# Patient Record
Sex: MALE | Race: WHITE | NOT HISPANIC OR LATINO | Employment: OTHER | URBAN - METROPOLITAN AREA
[De-identification: names, ages, dates, MRNs, and addresses within clinical notes are randomized per-mention and may not be internally consistent; named-entity substitution may affect disease eponyms.]

---

## 2017-05-06 PROCEDURE — 86803 HEPATITIS C AB TEST: CPT | Performed by: INTERNAL MEDICINE

## 2017-06-06 PROBLEM — Z80.0 FH: COLON CANCER: Status: ACTIVE | Noted: 2017-06-06

## 2017-11-16 PROBLEM — J30.9 CHRONIC ALLERGIC RHINITIS, UNSPECIFIED SEASONALITY, UNSPECIFIED TRIGGER: Status: ACTIVE | Noted: 2017-11-16

## 2018-12-11 PROBLEM — J30.9 CHRONIC ALLERGIC RHINITIS: Status: ACTIVE | Noted: 2017-11-16

## 2018-12-11 PROBLEM — M20.41 HAMMERTOE OF RIGHT FOOT: Status: ACTIVE | Noted: 2018-12-11

## 2019-06-17 PROBLEM — M54.50 RIGHT-SIDED LOW BACK PAIN WITHOUT SCIATICA, UNSPECIFIED CHRONICITY: Status: ACTIVE | Noted: 2019-06-17

## 2021-08-27 PROBLEM — M17.0 PRIMARY OSTEOARTHRITIS OF BOTH KNEES: Status: ACTIVE | Noted: 2021-08-27

## 2024-03-09 ENCOUNTER — HOSPITAL ENCOUNTER (OUTPATIENT)
Dept: LAB | Age: 74
Discharge: HOME | End: 2024-03-09

## 2024-03-09 DIAGNOSIS — Z11.2 ENCOUNTER FOR SCREENING FOR OTHER BACTERIAL DISEASES: Primary | ICD-10-CM

## 2024-03-09 PROCEDURE — 87081 CULTURE SCREEN ONLY: CPT | Performed by: CLINICAL MEDICAL LABORATORY

## 2024-03-10 LAB — MRSA SPEC QL CULT: NORMAL

## 2024-10-23 NOTE — H&P (VIEW-ONLY)
Julien Aldridge is a 74 year old male.   Patient presents with:  New Patient: Referred by Dr. Brush, Left inguinal hernia  ________________________________________________________________________  HPI:  This 74-year-old male presents as a patient of Dr. Brush in referral from Dr. Huynh for evaluation of a left inguinal hernia.      He noticed a bulge in the left groin area for recently.  It came to his attention on a cruise, where he was crouching in a shallow pool.  It was irritated, and he felt a bulge.  It gurgles, he massages it, and it goes away.  It is flat and asymptomatic in the morning, bulges out after being on his feet.  Few minor pinches of pain.  No incarceration.    PSH: No abdominal operations.  He had right total knee replacement 2 years ago, and a left total knee replacement 7 months ago.  The second 1 was done robotically, and the pain and recovery was much better.  He overdid it on the physical therapy and developed a left hamstring issue.  Occ: Retired, did various manual labor jobs.  He was referred by his friend John Bob on whom I operated.  FH: His brother developed colorectal cancer and .  The patient was concerned this could be related to such a problem.    Unaccompanied    Past Surgical History:   Procedure Laterality Date   • COLON CA SCRN NOT HI RSK IND N/A 2015    Procedure: COLONOSCOPY by WILNER Coronel MD Location: [facility],   • COLONOSCOPY     • COLONOSCOPY N/A 2020    Procedure: COLONOSCOPY, POSSIBLE BIOPSY, POSSIBLE POLYPECTOMY 67755;  Surgeon: Paddy Coronel MD;  Location: [facility], [facility]   • OTHER SURGICAL HISTORY      oral   • OTHER SURGICAL HISTORY  1973    tonsils   • OTHER SURGICAL HISTORY      right foot bunion   • OTHER SURGICAL HISTORY      right knee arthroscopy   • OTHER SURGICAL HISTORY      left arm lipoma   • PATIENT DOCUMENTED NOT TO HAVE EXPERIENCED ANY OF THE FOLLOWING EVENTS N/A 2015    Procedure: COLONOSCOPY,  POSSIBLE BIOPSY, POSSIBLE POLYPECTOMY 32826;  Surgeon: Paddy Coronel MD;  Location: [facility]   • PATIENT WITHOUGH PREOPERATIVE ORDER FOR IV ANTIBIOTIC SURGICAL SITE INFECTION PROPHYLAXIS. N/A 7/11/2015    Procedure: COLONOSCOPY, POSSIBLE BIOPSY, POSSIBLE POLYPECTOMY 76872;  Surgeon: Paddy Coronel MD;  Location: [facility]       Past Medical History:   Diagnosis Date   • Anxiety    • Essential hypertension        ALLERGIES:  Pcn [Penicillins]       UNKNOWN    Comment:40 years ago  CURRENT MEDS:  Current Outpatient Medications   Medication Sig Dispense Refill   • HYDROcodone-acetaminophen  MG Oral Tab  (Patient not taking: Reported on 10/16/2024)     • ibuprofen 400 MG Oral Tab Take 400 mg by mouth every 6 (six) hours as needed for Pain.     • losartan 100 MG Oral Tab Take 1 tablet (100 mg total) by mouth daily. 90 tablet 3   • Pseudoephedrine HCl, Deter, 30 MG Oral Tablet Abuse-Deterrent Take 30 mg by mouth daily as needed.       • Loratadine 10 MG Oral Cap Take 10 mg by mouth daily as needed.         ________________________________________________________________________  ROS:  GENERAL HEALTH: otherwise feels well  HEENT: no sinus pain or sore throat; no hearing loss  RESPIRATORY: denies shortness of breath, wheezing or cough   CARDIOVASCULAR: denies chest pain or HANSEN; no palpitations   GI: denies nausea, vomiting, constipation, diarrhea; no rectal bleeding; no heartburn  GENITAL/: no dysuria, urgency or frequency; bulging in the left groin area  HEMATOLOGY: denies hx anemia; denies bruising or excessive bleeding  ________________________________________________________________________  PHYSICAL EXAM:  GENERAL: well developed, well nourished male, in no apparent distress  NECK: supple; no JVD  RESPIRATORY: lungs clear  CARDIOVASCULAR: RRR  ABDOMEN: normal active BS+, soft, nondistended, no masses, nontender  GENITAL/: reducible left inguinal hernia (he states it  gets significantly bigger), no right inguinal hernia, testes ok  EXTREMITIES: no edema      DATE OF SERVICE: 10.16.2024   GROIN LEFT LIMITED SH(CPT=76882)    CLINICAL INDICATION:  Left inguinal hernia.    COMPARISON:  None available.    TECHNIQUE: Grayscale imaging was performed over the patient's area of subjective concern with a  linear transducer and multiple static images were obtained. Cine images were utilized as needed.    Impression   FINDINGS/IMPRESSION:  2 small fat-containing hernias in the left lower abdominal wall superior to the inguinal ligament.  CT recommended.     Noted.  We discussed the ultrasound result.  I do not feel he would need to have a CT scan, having examined him.    ________________________________________________________________________  ASSESSMENT/ PLAN:  Left inguinal hernia    Recommendation:  Robotic left inguinal hernia repair with mesh    The risks, benefits and alternatives were discussed including bleeding, infection, numbness over the incision site, recurrence of the hernia, mesh infection, testicular injuries, chronic postoperative pain, urinary retention, robotic/laparoscopic/open technique, anesthesia, return to activity, etc.  We also discussed the wound swelling and hardness that occurs postoperatively during the healing process.  Postoperative bruising and swelling of the genitalia is common, and more pronounced with larger hernias.  The patient received the booklet and saw the mesh sample.  Postoperatively no strenuous exercises or lifting, no more than 5 to 10 lbs.   All questions were answered.  The patient may call the  and schedule the surgery under general anesthesia at the [facility].  Patient will need SCIP antibiotics and SCDs.  Hold NSAIDs.  He states he has Norco leftover from his knee replacement surgery, and will take that postoperatively, half a tablet at a time.  He will see Dr. Brush or Dr. Huynh for  preoperative history and physical examination.    Thank you for involving me in the care of your patient.  ________________________________________________________________________

## 2024-10-29 RX ORDER — MULTIVITAMIN
1 TABLET ORAL DAILY
COMMUNITY

## 2024-10-29 RX ORDER — LOSARTAN POTASSIUM 100 MG/1
100 TABLET ORAL DAILY
COMMUNITY

## 2024-11-21 ENCOUNTER — HOSPITAL ENCOUNTER (OUTPATIENT)
Facility: HOSPITAL | Age: 74
Setting detail: HOSPITAL OUTPATIENT SURGERY
Discharge: HOME | End: 2024-11-21
Attending: SURGERY | Admitting: SURGERY
Payer: MEDICARE

## 2024-11-21 ENCOUNTER — ANESTHESIA (OUTPATIENT)
Dept: SURGERY | Facility: HOSPITAL | Age: 74
End: 2024-11-21
Payer: MEDICARE

## 2024-11-21 ENCOUNTER — ANESTHESIA EVENT (OUTPATIENT)
Dept: SURGERY | Facility: HOSPITAL | Age: 74
End: 2024-11-21
Payer: MEDICARE

## 2024-11-21 VITALS
TEMPERATURE: 98 F | HEIGHT: 70 IN | WEIGHT: 229 LBS | BODY MASS INDEX: 32.78 KG/M2 | HEART RATE: 52 BPM | RESPIRATION RATE: 16 BRPM | DIASTOLIC BLOOD PRESSURE: 88 MMHG | SYSTOLIC BLOOD PRESSURE: 132 MMHG | OXYGEN SATURATION: 95 %

## 2024-11-21 DIAGNOSIS — K40.90 LEFT INGUINAL HERNIA: Primary | ICD-10-CM

## 2024-11-21 PROCEDURE — 8E0W4CZ ROBOTIC ASSISTED PROCEDURE OF TRUNK REGION, PERCUTANEOUS ENDOSCOPIC APPROACH: ICD-10-PCS | Performed by: SURGERY

## 2024-11-21 PROCEDURE — 0YU64JZ SUPPLEMENT LEFT INGUINAL REGION WITH SYNTHETIC SUBSTITUTE, PERCUTANEOUS ENDOSCOPIC APPROACH: ICD-10-PCS | Performed by: SURGERY

## 2024-11-21 DEVICE — LAPAROSCOPIC SELF-FIXATING MESH POLYESTER WITH POLYLACTIC ACID GRIPS AND COLLAGEN FILM
Type: IMPLANTABLE DEVICE | Site: INGUINAL | Status: FUNCTIONAL
Brand: PROGRIP

## 2024-11-21 RX ORDER — SODIUM CHLORIDE, SODIUM LACTATE, POTASSIUM CHLORIDE, CALCIUM CHLORIDE 600; 310; 30; 20 MG/100ML; MG/100ML; MG/100ML; MG/100ML
INJECTION, SOLUTION INTRAVENOUS CONTINUOUS
Status: DISCONTINUED | OUTPATIENT
Start: 2024-11-21 | End: 2024-11-21

## 2024-11-21 RX ORDER — HYDROMORPHONE HYDROCHLORIDE 1 MG/ML
0.2 INJECTION, SOLUTION INTRAMUSCULAR; INTRAVENOUS; SUBCUTANEOUS EVERY 5 MIN PRN
Status: DISCONTINUED | OUTPATIENT
Start: 2024-11-21 | End: 2024-11-21

## 2024-11-21 RX ORDER — MEPERIDINE HYDROCHLORIDE 25 MG/ML
12.5 INJECTION INTRAMUSCULAR; INTRAVENOUS; SUBCUTANEOUS AS NEEDED
Status: DISCONTINUED | OUTPATIENT
Start: 2024-11-21 | End: 2024-11-21

## 2024-11-21 RX ORDER — METOCLOPRAMIDE HYDROCHLORIDE 5 MG/ML
10 INJECTION INTRAMUSCULAR; INTRAVENOUS EVERY 8 HOURS PRN
Status: DISCONTINUED | OUTPATIENT
Start: 2024-11-21 | End: 2024-11-21

## 2024-11-21 RX ORDER — ACETAMINOPHEN 500 MG
1000 TABLET ORAL ONCE AS NEEDED
Status: COMPLETED | OUTPATIENT
Start: 2024-11-21 | End: 2024-11-21

## 2024-11-21 RX ORDER — HYDROMORPHONE HYDROCHLORIDE 1 MG/ML
0.4 INJECTION, SOLUTION INTRAMUSCULAR; INTRAVENOUS; SUBCUTANEOUS EVERY 5 MIN PRN
Status: DISCONTINUED | OUTPATIENT
Start: 2024-11-21 | End: 2024-11-21

## 2024-11-21 RX ORDER — LABETALOL HYDROCHLORIDE 5 MG/ML
5 INJECTION, SOLUTION INTRAVENOUS EVERY 5 MIN PRN
Status: DISCONTINUED | OUTPATIENT
Start: 2024-11-21 | End: 2024-11-21

## 2024-11-21 RX ORDER — HYDROCODONE BITARTRATE AND ACETAMINOPHEN 5; 325 MG/1; MG/1
1 TABLET ORAL ONCE AS NEEDED
Status: COMPLETED | OUTPATIENT
Start: 2024-11-21 | End: 2024-11-21

## 2024-11-21 RX ORDER — HYDROMORPHONE HYDROCHLORIDE 1 MG/ML
INJECTION, SOLUTION INTRAMUSCULAR; INTRAVENOUS; SUBCUTANEOUS
Status: COMPLETED
Start: 2024-11-21 | End: 2024-11-21

## 2024-11-21 RX ORDER — BUPIVACAINE HYDROCHLORIDE 2.5 MG/ML
INJECTION, SOLUTION EPIDURAL; INFILTRATION; INTRACAUDAL AS NEEDED
Status: DISCONTINUED | OUTPATIENT
Start: 2024-11-21 | End: 2024-11-21 | Stop reason: HOSPADM

## 2024-11-21 RX ORDER — LIDOCAINE HYDROCHLORIDE 10 MG/ML
INJECTION, SOLUTION EPIDURAL; INFILTRATION; INTRACAUDAL; PERINEURAL AS NEEDED
Status: DISCONTINUED | OUTPATIENT
Start: 2024-11-21 | End: 2024-11-21 | Stop reason: SURG

## 2024-11-21 RX ORDER — ROCURONIUM BROMIDE 10 MG/ML
INJECTION, SOLUTION INTRAVENOUS AS NEEDED
Status: DISCONTINUED | OUTPATIENT
Start: 2024-11-21 | End: 2024-11-21 | Stop reason: SURG

## 2024-11-21 RX ORDER — HEPARIN SODIUM 5000 [USP'U]/ML
5000 INJECTION, SOLUTION INTRAVENOUS; SUBCUTANEOUS ONCE
Status: COMPLETED | OUTPATIENT
Start: 2024-11-21 | End: 2024-11-21

## 2024-11-21 RX ORDER — ACETAMINOPHEN 500 MG
1000 TABLET ORAL ONCE
Status: DISCONTINUED | OUTPATIENT
Start: 2024-11-21 | End: 2024-11-21 | Stop reason: HOSPADM

## 2024-11-21 RX ORDER — HYDROCODONE BITARTRATE AND ACETAMINOPHEN 5; 325 MG/1; MG/1
2 TABLET ORAL ONCE AS NEEDED
Status: COMPLETED | OUTPATIENT
Start: 2024-11-21 | End: 2024-11-21

## 2024-11-21 RX ORDER — DEXAMETHASONE SODIUM PHOSPHATE 4 MG/ML
VIAL (ML) INJECTION AS NEEDED
Status: DISCONTINUED | OUTPATIENT
Start: 2024-11-21 | End: 2024-11-21 | Stop reason: SURG

## 2024-11-21 RX ORDER — ONDANSETRON 2 MG/ML
4 INJECTION INTRAMUSCULAR; INTRAVENOUS EVERY 6 HOURS PRN
Status: DISCONTINUED | OUTPATIENT
Start: 2024-11-21 | End: 2024-11-21

## 2024-11-21 RX ORDER — NEOSTIGMINE METHYLSULFATE 1 MG/ML
INJECTION INTRAVENOUS AS NEEDED
Status: DISCONTINUED | OUTPATIENT
Start: 2024-11-21 | End: 2024-11-21 | Stop reason: SURG

## 2024-11-21 RX ORDER — ONDANSETRON 2 MG/ML
INJECTION INTRAMUSCULAR; INTRAVENOUS AS NEEDED
Status: DISCONTINUED | OUTPATIENT
Start: 2024-11-21 | End: 2024-11-21 | Stop reason: SURG

## 2024-11-21 RX ORDER — HYDROMORPHONE HYDROCHLORIDE 1 MG/ML
0.6 INJECTION, SOLUTION INTRAMUSCULAR; INTRAVENOUS; SUBCUTANEOUS EVERY 5 MIN PRN
Status: DISCONTINUED | OUTPATIENT
Start: 2024-11-21 | End: 2024-11-21

## 2024-11-21 RX ORDER — NALOXONE HYDROCHLORIDE 0.4 MG/ML
80 INJECTION, SOLUTION INTRAMUSCULAR; INTRAVENOUS; SUBCUTANEOUS AS NEEDED
Status: DISCONTINUED | OUTPATIENT
Start: 2024-11-21 | End: 2024-11-21

## 2024-11-21 RX ORDER — DIPHENHYDRAMINE HYDROCHLORIDE 50 MG/ML
12.5 INJECTION INTRAMUSCULAR; INTRAVENOUS AS NEEDED
Status: DISCONTINUED | OUTPATIENT
Start: 2024-11-21 | End: 2024-11-21

## 2024-11-21 RX ORDER — PHENYLEPHRINE HCL 10 MG/ML
VIAL (ML) INJECTION AS NEEDED
Status: DISCONTINUED | OUTPATIENT
Start: 2024-11-21 | End: 2024-11-21 | Stop reason: SURG

## 2024-11-21 RX ORDER — GLYCOPYRROLATE 0.2 MG/ML
INJECTION, SOLUTION INTRAMUSCULAR; INTRAVENOUS AS NEEDED
Status: DISCONTINUED | OUTPATIENT
Start: 2024-11-21 | End: 2024-11-21 | Stop reason: SURG

## 2024-11-21 RX ADMIN — GLYCOPYRROLATE 0.4 MG: 0.2 INJECTION, SOLUTION INTRAMUSCULAR; INTRAVENOUS at 11:47:00

## 2024-11-21 RX ADMIN — ROCURONIUM BROMIDE 50 MG: 10 INJECTION, SOLUTION INTRAVENOUS at 09:56:00

## 2024-11-21 RX ADMIN — PHENYLEPHRINE HCL 50 MCG: 10 MG/ML VIAL (ML) INJECTION at 10:17:00

## 2024-11-21 RX ADMIN — SODIUM CHLORIDE, SODIUM LACTATE, POTASSIUM CHLORIDE, CALCIUM CHLORIDE: 600; 310; 30; 20 INJECTION, SOLUTION INTRAVENOUS at 09:47:00

## 2024-11-21 RX ADMIN — DEXAMETHASONE SODIUM PHOSPHATE 8 MG: 4 MG/ML VIAL (ML) INJECTION at 11:37:00

## 2024-11-21 RX ADMIN — PHENYLEPHRINE HCL 50 MCG: 10 MG/ML VIAL (ML) INJECTION at 10:58:00

## 2024-11-21 RX ADMIN — ONDANSETRON 4 MG: 2 INJECTION INTRAMUSCULAR; INTRAVENOUS at 11:37:00

## 2024-11-21 RX ADMIN — LIDOCAINE HYDROCHLORIDE 100 MG: 10 INJECTION, SOLUTION EPIDURAL; INFILTRATION; INTRACAUDAL; PERINEURAL at 09:56:00

## 2024-11-21 RX ADMIN — NEOSTIGMINE METHYLSULFATE 3 MG: 1 INJECTION INTRAVENOUS at 11:47:00

## 2024-11-21 RX ADMIN — GLYCOPYRROLATE 0.3 MG: 0.2 INJECTION, SOLUTION INTRAMUSCULAR; INTRAVENOUS at 10:17:00

## 2024-11-21 RX ADMIN — PHENYLEPHRINE HCL 50 MCG: 10 MG/ML VIAL (ML) INJECTION at 11:10:00

## 2024-11-21 RX ADMIN — PHENYLEPHRINE HCL 50 MCG: 10 MG/ML VIAL (ML) INJECTION at 10:55:00

## 2024-11-21 NOTE — DISCHARGE INSTRUCTIONS
Okay to shower on 11/22  Leave skin glue until it flakes off  Ice to the incisions  Over-the-counter pain medications are okay  He has prescriptions for tramadol, Norco, and oxycodone at home for breakthrough pain  Expect bruising and swelling  No lifting over 10 pounds for 2 weeks  No exercise more vigorous than walking for 2 weeks  Stool softener    You have been given a prescription for Norco 5/325    - Norco was given to you at: 1:30PM    - Next dose is due: after 5:30PM    - Take this medication as directed    Norco is a narcotic and can be constipating or can upset your stomach    - Don't take Norco on an empty stomach    - Drink plenty of water    - This medication contains Tylenol (acetaminophen)    - Do not take additional Tylenol while taking Norco

## 2024-11-21 NOTE — OPERATIVE REPORT
[facility] OPERATIVE REPORT     Patient Name:  Julien Aldridge  MRN: [id number]    Date of Operation:  2024  : 9/15/1950        PREOPERATIVE DIAGNOSIS: Left inguinal hernia.     POSTOPERATIVE DIAGNOSIS: Left inguinal hernia.      PROCEDURE PERFORMED:  Robotic left inguinal hernia repair with mesh.     SURGEON: Mateusz Mark MD     ASSISTANT: CITLALY Caceres, (services required for positioning, prepping, draping, setting up the field, exposing, retracting, suturing, moving tissue, running the camera, introducing suture and the mesh, closing, tying, cutting, dressing, etc.)    ANESTHESIA: General.      COMPLICATIONS: None.      ESTIMATED BLOOD LOSS: 5 mL.      DRAINS: None.      BLOOD PRODUCTS ADMINISTERED: None.      IMPLANTS AND GRAFTS:  12 x 16 cm ProGrip Mesh     SPECIMENS: None.      HISTORY: This patient is a 74 year old year old-year-old male who has a history of a symptomatic left groin bulge.  He was evaluated and found to have a reducible left inguinal hernia on examination, as confirmed on ultrasound. Robotic left inguinal hernia repair with mesh was recommended. The risks, benefits, and alternatives were discussed.      FINDINGS: The patient had a large indirect left inguinal hernia repaired with a 12 x 16 cm ProGrip mesh patch.     PROCEDURE:  The patient was seen in the preoperative holding area with his friend, Sharon.  The surgical plan was reviewed.  The left groin was initialed.  He was taken to the operating room and placed in the supine position.  He was administered general anesthesia.  The abdomen was clipped, prepped, and draped.  A timeout was performed.  Local anesthetic was infiltrated.  A Veress needle was introduced above the umbilicus.  The abdomen was insufflated with carbon dioxide gas.  An 8 mm trocar was inserted, followed by the scope.  There was no entry injury.  Additional local anesthetic was infiltrated, and an additional 8 mm trocar was placed on each side of  the abdomen under laparoscopic vision.  The abdomen and pelvis were inspected.  The visualized peritoneal surfaces, omentum, and intestines were unremarkable.  The patient was placed in Trendelenburg position.  The pelvis was visualized.  The patient had a large indirect left inguinal hernia without incarceration.  There was no right inguinal hernia.  The robot was docked and targeted.  The instruments were introduced under vision, and the ports were burped.  Working from the console, the peritoneum was scored from the anterior superior iliac spine area to the midline.  The peritoneal flap was developed into the space of Retzius, extending the dissection along Alessio's ligament to the right of the midline, and around to the area of the iliac vessels.  Laterally, the dissection was carried as close to the peritoneum as possible to leave the adventitial and adipose tissues with the retroperitoneum.  Throughout all of the dissection, care was used to identify and preserve the epigastric vessels, the iliac vessels, the spermatic cord structures, and the vas deferens.  The flap dissection was carried to the hernia.  The large indirect hernia sac was tediously dissected from the spermatic cord structures and reduced.  Several incidental defects in the peritoneum occurred consequential to the tedious and extensive dissection.  There was no cord lipoma.  The dissection was continued posteriorly to beyond the genu of the vas deferens.  The space was confirmed to be hemostatic.  A 12 x 16 cm ProGrip mesh was introduced.  It was positioned with the adherent side facing the abdominal wall, and centered on the hernia defect.  It wrapped around Alessio's ligament into the space of Retzius.  It crossed the midline to the right.  Care was used to confirm there was no peritoneum extending beneath the edges of the mesh, and that there were no wrinkles.  The mesh provided complete coverage of the myopectineal orifice on the left.   Tacking sutures of Vicryl were used to secure the mesh to Alessio's ligament medially, and to the underside of the anterior abdominal wall anteriorly.  Care was used to avoid placing tacking sutures in the triangles of \"pain\" and \"doom\".  Satisfied with the mesh positioning and fixation, the peritoneum was closed with a running V-Loc suture.  This provided a tight closure of the peritoneal flap suture line without defects or exposed mesh.  Completion inspection of the peritoneal cavity confirmed the absence of inadvertent injury.  The more posterior defects in the peritoneum consequential to the dissection were closed with V-Loc, completely closing the peritoneum and leaving no defects for herniation.  The mesh was visualized through the peritoneum in excellent position without shifting or wrinkling.  The fascial closure device was used to secure the fascia at the midline trocar site with a 0 Vicryl suture.  The remaining trochars were removed under direct vision internally to assure hemostasis.  The abdomen was desufflated.  The wounds were irrigated, rendered hemostatic, closed with Vicryl subcuticular stitches, and dressed with Dermabond.  The needle, sponge, and instrument counts were reported as correct.  The position of the testicles was confirmed in the scrotum.  The patient was awakened and transported to recovery.  Sharon was notified of the findings and his status.        Mateusz Mark MD

## 2024-11-21 NOTE — ANESTHESIA PROCEDURE NOTES
Airway  Date/Time: 11/21/2024 9:56 AM  Urgency: elective    Airway not difficult    General Information and Staff    Patient location during procedure: OR  Anesthesiologist: Jordy Nicole MD  Performed: anesthesiologist   Performed by: Jordy Nicole MD  Authorized by: Jordy Nicole MD      Indications and Patient Condition  Indications for airway management: anesthesia  Spontaneous Ventilation: absent  Sedation level: deep  Preoxygenated: yes  Patient position: sniffing  Mask difficulty assessment: 1 - vent by mask    Final Airway Details  Final airway type: endotracheal airway      Successful airway: ETT  Cuffed: yes   Successful intubation technique: direct laryngoscopy  Endotracheal tube insertion site: oral  Blade: Alyssa  Blade size: #3  ETT size (mm): 7.5    Cormack-Lehane Classification: grade IIA - partial view of glottis  Placement verified by: capnometry   Cuff volume (mL): 9  Measured from: lips  ETT to lips (cm): 24  Number of attempts at approach: 1  Number of other approaches attempted: 0

## 2024-11-21 NOTE — INTERVAL H&P NOTE
Pre-op Diagnosis: LEFT INGUINAL HERNIA    The above referenced H&P was reviewed by Mateusz Balwdin MD on 11/21/2024, the patient was examined and no significant changes have occurred in the patient's condition since the H&P was performed.  I discussed with the patient and/or legal representative the potential benefits, risks and side effects of this procedure; the likelihood of the patient achieving goals; and potential problems that might occur during recuperation.  I discussed reasonable alternatives to the procedure, including risks, benefits and side effects related to the alternatives and risks related to not receiving this procedure.  We will proceed with procedure as planned.    No changes today. I met with him and his friend, legs.  The surgical plan was reviewed.  We discussed anesthesia, recovery, restrictions, pain management, etc.  He has tramadol, Norco, and OxyContin at home as leftovers from prior surgeries, which he plans to take if needed.  The left groin was initialed.

## 2024-11-21 NOTE — ANESTHESIA POSTPROCEDURE EVALUATION
[facility]    Julien [facility]INOS[facility] Patient Status:  Hospital Outpatient Surgery   Age/Gender 74 year old male MRN [id number]   Location [facility] POST ANESTHESIA CARE UNIT Attending Mateusz Baldwin MD   Hosp Day # 0 PCP Melvin Brush MD       Anesthesia Post-op Note    XI ROBOT-ASSISTED LEFT INGUINAL HERNIA REPAIR WITH MESH    Procedure Summary       Date: 11/21/24 Room / Location:  MAIN OR  /  MAIN OR    Anesthesia Start: 0947 Anesthesia Stop: 1153    Procedure: XI ROBOT-ASSISTED LEFT INGUINAL HERNIA REPAIR WITH MESH (Left: Abdomen) Diagnosis: (LEFT INGUINAL HERNIA)    Surgeons: Mateusz Baldwin MD Anesthesiologist: Jordy Nicole MD    Anesthesia Type: general ASA Status: 2            Anesthesia Type: general    Vitals Value Taken Time   /69 11/21/24 1203   Temp 98.2 °F (36.8 °C) 11/21/24 1153   Pulse 47 11/21/24 1203   Resp 15 11/21/24 1203   SpO2 97 % 11/21/24 1203   Vitals shown include unfiled device data.    Patient Location: PACU    Anesthesia Type: general    Airway Patency: patent and extubated    Postop Pain Control: adequate    Mental Status: preanesthetic baseline    Nausea/Vomiting: none    Cardiopulmonary/Hydration status: stable euvolemic    Complications: no apparent anesthesia related complications    Postop vital signs: stable    Dental Exam: Unchanged from Preop    Patient to be discharged from PACU when criteria met.

## 2024-11-21 NOTE — ANESTHESIA PREPROCEDURE EVALUATION
PRE-OP EVALUATION    Patient Name: Julien Aldridge    Admit Diagnosis: LEFT INGUINAL HERNIA    Pre-op Diagnosis: LEFT INGUINAL HERNIA    XI ROBOT-ASSISTED LEFT INGUINAL HERNIA REPAIR WITH MESH, POSSIBLE OPEN    Anesthesia Procedure: XI ROBOT-ASSISTED LEFT INGUINAL HERNIA REPAIR WITH MESH, POSSIBLE OPEN (Left: Abdomen)    Surgeons and Role:     * Mateusz Baldwin MD - Primary    Pre-op vitals reviewed.  Temp: 97.7 °F (36.5 °C)  Pulse: 65  Resp: 16  BP: 142/84  SpO2: 98 %  Body mass index is 32.86 kg/m².    Current medications reviewed.  Hospital Medications:   lactated ringers infusion   Intravenous Continuous    [COMPLETED] heparin (Porcine) 5000 UNIT/ML injection 5,000 Units  5,000 Units Subcutaneous Once    bupivacaine PF (Marcaine) 0.25% injection    PRN       Outpatient Medications:   Prescriptions Prior to Admission[1]    Allergies: Pcn [penicillins]      Anesthesia Evaluation    Patient summary reviewed.    Anesthetic Complications  (-) history of anesthetic complications         GI/Hepatic/Renal    Negative GI/hepatic/renal ROS.  (-) GERD                           Cardiovascular      ECG reviewed.  Exercise tolerance: good     MET: >4    (+) obesity  (+) hypertension       (-) past MI                (-) angina     (-) HANSEN  (-) orthopnea  (-) PND     Endo/Other    Negative endo/other ROS.                              Pulmonary      (+) asthma                     Neuro/Psych    Negative neuro/psych ROS.                                  Past Surgical History:   Procedure Laterality Date    Colon ca scrn not hi rsk ind N/A 07/11/2015    Procedure: COLONOSCOPY by WILNER Coronel MD Location: [facility],    Colonoscopy N/A 08/14/2020    Procedure: COLONOSCOPY, POSSIBLE BIOPSY, POSSIBLE POLYPECTOMY 94218;  Surgeon: Paddy Coronel MD;  Location: [facility], [facility]    Other surgical history      oral    Other surgical history      right foot bunion    Other surgical history      right knee arthroscopy     Other surgical history      left arm lipoma    Patient documented not to have experienced any of the following events N/A 07/11/2015    Procedure: COLONOSCOPY, POSSIBLE BIOPSY, POSSIBLE POLYPECTOMY 51306;  Surgeon: Paddy Coronel MD;  Location: [facility]    Patient withough preoperative order for iv antibiotic surgical site infection prophylaxis. N/A 07/11/2015    Procedure: COLONOSCOPY, POSSIBLE BIOPSY, POSSIBLE POLYPECTOMY 54988;  Surgeon: Paddy Coronel MD;  Location: [facility]    Tonsillectomy  1973    Total knee replacement      Upper gi endoscopy,exam       Social History     Socioeconomic History    Marital status: Single   Occupational History    Occupation: retired from machine shop--   Tobacco Use    Smoking status: Never    Smokeless tobacco: Never   Vaping Use    Vaping status: Never Used   Substance and Sexual Activity    Alcohol use: Yes     Alcohol/week: 1.0 - 2.0 standard drink of alcohol     Types: 1 - 2 Standard drinks or equivalent per week    Drug use: No    Sexual activity: Yes     Partners: Female     History   Drug Use No     Available pre-op labs reviewed.               Airway      Mallampati: I  Mouth opening: >3 FB  TM distance: > 6 cm  Neck ROM: full Cardiovascular    Cardiovascular exam normal.  Rhythm: regular  Rate: normal  (-) murmur   Dental    Dentition appears grossly intact         Pulmonary    Pulmonary exam normal.  Breath sounds clear to auscultation bilaterally.        (-) wheezes       Other findings              ASA: 2   Plan: general  NPO status verified and patient meets guidelines.  Patient has not taken beta blockers in last 24 hours.        Plan/risks discussed with: patient              We discussed GA w/ETT and possible scratchy throat and rarely dental damage.  We discussed analgesic plan and PONV prophylaxis.  The patient's questions were answered and consent was attained.            [1]   Medications Prior to Admission    Medication Sig Dispense Refill Last Dose/Taking    losartan 100 MG Oral Tab Take 1 tablet (100 mg total) by mouth daily.   11/20/2024 at  8:00 AM    Multiple Vitamin (ONE-DAILY MULTI VITAMINS) Oral Tab Take 1 tablet by mouth daily.   Past Month    Pseudoephedrine HCl, Deter, 30 MG Oral Tablet Abuse-Deterrent Take 30 mg by mouth daily.   Past Month    Loratadine 10 MG Oral Cap Take 10 mg by mouth daily.    Past Month

## (undated) DEVICE — TIP COVER ACCESSORY

## (undated) DEVICE — LAPAROVUE VISIBILITY SYSTEM LAPAROSCOPIC SOLUTIONS: Brand: LAPAROVUE

## (undated) DEVICE — SEAL

## (undated) DEVICE — ABSORBABLE WOUND CLOSURE DEVICE: Brand: V-LOC 90

## (undated) DEVICE — ARM DRAPE

## (undated) DEVICE — MONOPOLAR CURVED SCISSORS: Brand: ENDOWRIST

## (undated) DEVICE — ADHESIVE SKIN TOP FOR WND CLSR DERMBND ADV

## (undated) DEVICE — ROBOTIC GENERAL: Brand: MEDLINE INDUSTRIES, INC.

## (undated) DEVICE — FENESTRATED BIPOLAR FORCEPS: Brand: ENDOWRIST

## (undated) DEVICE — DRAPE,TOP,102X53,STERILE: Brand: MEDLINE

## (undated) DEVICE — BLADELESS OBTURATOR: Brand: WECK VISTA

## (undated) DEVICE — HUNTER GASPER TIP, DISPOSABLE: Brand: RENEW

## (undated) DEVICE — ANTIBACTERIAL UNDYED BRAIDED (POLYGLACTIN 910), SYNTHETIC ABSORBABLE SUTURE: Brand: COATED VICRYL

## (undated) DEVICE — COLUMN DRAPE

## (undated) DEVICE — GLOVE SUR 7.5 SENSICARE PI PIP CRM PWD F

## (undated) DEVICE — COVER,LIGHT,CAMERA,HARD,1/PK,STRL: Brand: MEDLINE

## (undated) DEVICE — 40580 - THE PINK PAD - ADVANCED TRENDELENBURG POSITIONING KIT: Brand: 40580 - THE PINK PAD - ADVANCED TRENDELENBURG POSITIONING KIT

## (undated) DEVICE — SUT COAT VCRL + 0 54IN ABSRB UD ANTIBACT

## (undated) DEVICE — MEGA SUTURECUT ND: Brand: ENDOWRIST

## (undated) DEVICE — STERILE DRAPE FOR USE WITH SITUATE ROOM SCANNER: Brand: SITUATE

## (undated) DEVICE — VIOLET BRAIDED (POLYGLACTIN 910), SYNTHETIC ABSORBABLE SUTURE: Brand: COATED VICRYL

## (undated) DEVICE — INSUFFLATION NEEDLE TO ESTABLISH PNEUMOPERITONEUM.: Brand: INSUFFLATION NEEDLE

## (undated) DEVICE — UNDYED BRAIDED (POLYGLACTIN 910), SYNTHETIC ABSORBABLE SUTURE: Brand: COATED VICRYL

## (undated) NOTE — Clinical Note
OUTSIDE TESTING RESULT REQUEST     IMPORTANT: FOR YOUR IMMEDIATE ATTENTION  Please FAX all test results listed below to: [phone]     Testing already done on or about: ***     * * * * If testing is NOT complete, arrange with patient A.S.A.P. * * * *      Patient Name: Julien Aldridge  Surgery Date: 2024  Medical Record: [id number]  CSN: [id number]  : 9/15/1950 - A: 74 y     Sex: male  Surgeon(s):  Mateusz Baldwin MD  Procedure: XI ROBOT-ASSISTED LEFT INGUINAL HERNIA REPAIR WITH MESH, POSSIBLE OPEN  Anesthesia Type: General     Surgeon: Mateusz Baldwin MD     The following Testing and Time Line are REQUIRED PER ANESTHESIA     {EDW PAT SENDOUT:7551}      Thank You,   Sent by:***

## (undated) NOTE — LETTER
OUTSIDE TESTING RESULT REQUEST     IMPORTANT: FOR YOUR IMMEDIATE ATTENTION  Please FAX all test results listed below to: [phone]     Testing already done on or about:      * * * * If testing is NOT complete, arrange with patient A.S.A.P. * * * *      Patient Name: Julien Aldridge  Surgery Date: 2024  Medical Record: [id number]  CSN: [id number]  : 9/15/1950 - A: 74 y     Sex: male  Surgeon(s):  Mateusz Baldwin MD  Procedure: XI ROBOT-ASSISTED LEFT INGUINAL HERNIA REPAIR WITH MESH, POSSIBLE OPEN  Anesthesia Type: General     Surgeon: Mateusz Baldwin MD     The following Testing and Time Line are REQUIRED PER ANESTHESIA     EKG READ AND SIGNED WITHIN   90 days  CBC [with Differential & Platelets] within  30 days  BMP (requires 4 hour fast) within  90 days      Thank You,   Sent by: Lana ALMANZAR

## (undated) NOTE — LETTER
PATIENT WILL SEE DR. FRANCO GOMEZ ON 2024 @ 1PM    OUTSIDE TESTING RESULT REQUEST     IMPORTANT: FOR YOUR IMMEDIATE ATTENTION  Please FAX all test results listed below to: [phone]       * * * * If testing is NOT complete, arrange with patient A.S.A.P. * * * *      Patient Name: Julien Aldridge  Surgery Date: 2024  Medical Record: [id number]  CSN: [id number]  : 9/15/1950 - A: 74 y     Sex: male  Surgeon(s):  Mateusz aBldwin MD  Procedure: XI ROBOT-ASSISTED LEFT INGUINAL HERNIA REPAIR WITH MESH, POSSIBLE OPEN  Anesthesia Type: General     Surgeon: Mateusz Baldwin MD     The following Testing and Time Line are REQUIRED PER ANESTHESIA     EKG READ AND SIGNED WITHIN   90 days  CBC, Platelet; NO Differential within  30 days  BMP (requires 4 hour fast) within  90 days      Thank You,   Sent by: [employer]